# Patient Record
Sex: MALE | Race: WHITE
[De-identification: names, ages, dates, MRNs, and addresses within clinical notes are randomized per-mention and may not be internally consistent; named-entity substitution may affect disease eponyms.]

---
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
## 2024-03-21 ENCOUNTER — HOSPITAL ENCOUNTER (OUTPATIENT)
Facility: HOSPITAL | Age: 19
Setting detail: RECURRING SERIES
Discharge: HOME | End: 2024-03-24
Payer: OTHER GOVERNMENT

## 2024-03-21 PROCEDURE — 97161 PT EVAL LOW COMPLEX 20 MIN: CPT

## 2024-03-21 PROCEDURE — 97112 NEUROMUSCULAR REEDUCATION: CPT

## 2024-03-21 NOTE — THERAPY EVALUATION
[facility]  [address], [address]  [place] [address]   Phone: [phone] Fax: [phone]  Plan of Care / Statement of Necessity for Physical Therapy Services     Patient Name: Gaston Flanagan : 2005   Medical   Diagnosis: Pain in thoracic spine [M54.6] Treatment Diagnosis: M54.6  THORACIC PAIN   Onset Date: Dec 2023 Payor: Payor:  EAST / Plan:  EAST / Product Type: *No Product type* /    Referral Source: Teri rPuitt MD Start of Care (SOC): 3/21/2024   Prior Hospitalization: See medical history Provider #: [id number]   Prior Level of Function: Independent   Comorbidities: None reported     Assessment:    Pt is a RHD 19 yo male referred to PT for \"sprain of chondrosternal joint\". He presents with pain localized to R sided sternum into R ant shoulder s/sx consistent with MD referral and possible R pec strain.  Pt endorses TATY of performing 60 push-ups every other hour daily, notes no particular instance of injury but gradual onset of R sided chest pain until Dec 2023 when he ceased the activity. Pain ranges from 2-5/10, worse with \"stretching, coughing, heavy breathing, sleeping\", no change/improvement with heat or ice. Pt presents with pan with end ranges of R shoulder horizontal abduction/adduction, resisted R shoulder IR, and is TTP at R sternal border and pec insertion at humerus. Functional deficits at this time include exercise activity and sleeping. Pt would benefit from skilled PT to address these deficits to assist pt to full return to PLOF activity. HEP initiated with good pt tolerance noted. There are no red flags at this time.     Not post operative    Evaluation Complexity:  History:  LOW Complexity : Zero comorbidities / personal factors that will impact the outcome / POC; Examination:  HIGH Complexity : 4+ Standardized tests and measures addressing body structure, function, activity limitation and / or

## 2024-03-21 NOTE — PROGRESS NOTES
PT DAILY TREATMENT NOTE/SHOULDER EVAL     Patient Name: Gaston Flanagan    Date: 3/21/2024    : 2005  Insurance: Payor: [employer] EAST / Plan: [employer] EAST / Product Type: *No Product type* /      Patient  verified yes    Visit #   Current / Total 1 10   Time   In / Out 1230 112   Pain   In / Out 4 4   Subjective Functional Status/Changes: see POC       Treatment Area: Pain in thoracic spine [M54.6]    SUBJECTIVE  Pain Level (0-10 scale):   Current: 4/10 Best: 2/10 Worst: 5/10  Alleviated By:  Aggravated By: stretching, coughing/sneezing, sleeping, heavy breathing   No change: icing/heating    PLOF: I  Mechanism of Injury: Pt reports was doing a lot of exercises, was not appropriately resting, after a while strained muscle at sternum. Denies single episode of pain, popping, traumal insidiously got worse over time. Was doing a lot of push ups, approx 60 every other hour. This occurred 4 mo ago. Has refrained from all exercise over past 4 months. Denies improvements, has tried stretching, icing, heating.   Current symptoms/Complaints: Pain at R side of sternum, constant.   PMHx/Surgical Hx: no previous back, chest, shoulder injuries  Diagnostic Tests: Xrays \"showed nothing\"   Work Hx: not currently working   Hand Dominance: R         min [x]Eval                        Therapeutic Procedures:  Tx Min Billable or 1:1 Min (if diff from Tx Min) Procedure, Rationale, Specifics   10  [id number] Neuromuscular Re-Education (timed):  improve balance, coordination, kinesthetic sense, posture, core stability and proprioception to improve patient's ability to develop conscious control of individual muscles and awareness of position of extremities in order to progress to PLOF and address remaining functional goals. (see flow sheet as applicable)     Details if applicable:           Details if applicable:     Total Total Reminder: MC/BC bill using total billable min of TIMED therapeutic procedures (example: do not include

## 2024-03-26 ENCOUNTER — HOSPITAL ENCOUNTER (OUTPATIENT)
Facility: HOSPITAL | Age: 19
Setting detail: RECURRING SERIES
Discharge: HOME | End: 2024-03-29
Payer: OTHER GOVERNMENT

## 2024-03-26 PROCEDURE — 97112 NEUROMUSCULAR REEDUCATION: CPT

## 2024-03-26 PROCEDURE — 97110 THERAPEUTIC EXERCISES: CPT

## 2024-03-26 PROCEDURE — 97140 MANUAL THERAPY 1/> REGIONS: CPT

## 2024-03-26 NOTE — PROGRESS NOTES
PHYSICAL / OCCUPATIONAL THERAPY - DAILY TREATMENT NOTE    Patient Name: Gaston Flanagan    Date: 3/26/2024    : 2005  Insurance: Payor: [employer] EAST / Plan: [employer] EAST / Product Type: *No Product type* /      Patient  verified Yes     Visit #   Current / Total 2 10   Time   In / Out 820 910   Pain   In / Out 3 3   Subjective Functional Status/Changes: Received HEP and has been performing them as well as ice.     TREATMENT AREA =  Pain in thoracic spine [M54.6]    OBJECTIVE    Modalities Rationale:     decrease inflammation, decrease pain, and increase tissue extensibility to improve patient's ability to progress to PLOF and address remaining functional goals.     min [] Estim Unattended, type/location:                                      []  w/ice    []  w/heat    min [] Estim Attended, type/location:                                     []  w/US     []  w/ice    []  w/heat    []  TENS insruct      min []  Mechanical Traction: type/lbs                   []  pro   []  sup   []  int   []  cont    []  before manual    []  after manual    min []  Ultrasound, settings/location:      min  unbill []  Ice     []  Heat    location/position:     min []  Paraffin,  details:     min []  Vasopneumatic Device, press/temp:     min []  Whirlpool / Fluido:    If using vaso (only need to measure limb vaso being performed on)      pre-treatment girth :       post-treatment girth :       measured at (landmark location) :      min []  Other:    Skin assessment post-treatment:   Intact      Therapeutic Procedures:    Tx Min Billable or 1:1 Min (if diff from Tx Min) Procedure, Rationale, Specifics   10  [id number] Manual Therapy (timed):  decrease pain, increase ROM, and increase tissue extensibility to improve patient's ability to progress to PLOF and address remaining functional goals.  The manual therapy interventions were performed at a separate and distinct time from the therapeutic activities interventions . (see flow sheet as

## 2024-03-28 ENCOUNTER — HOSPITAL ENCOUNTER (OUTPATIENT)
Facility: HOSPITAL | Age: 19
Setting detail: RECURRING SERIES
Discharge: HOME | End: 2024-03-31
Payer: OTHER GOVERNMENT

## 2024-03-28 PROCEDURE — 97112 NEUROMUSCULAR REEDUCATION: CPT

## 2024-03-28 PROCEDURE — 97110 THERAPEUTIC EXERCISES: CPT

## 2024-03-28 PROCEDURE — 97140 MANUAL THERAPY 1/> REGIONS: CPT

## 2024-03-28 NOTE — PROGRESS NOTES
PHYSICAL / OCCUPATIONAL THERAPY - DAILY TREATMENT NOTE    Patient Name: Gaston Flanagan    Date: 3/28/2024    : 2005  Insurance: Payor:  EAST / Plan: [employer] EAST / Product Type: *No Product type* /      Patient  verified Yes     Visit #   Current / Total 3 10   Time   In / Out 1140 1230   Pain   In / Out 4 2   Subjective Functional Status/Changes: Just a little more pain today.  Just woke up with more pain, didn't do anything more, just the exercises.     TREATMENT AREA =  Pain in thoracic spine [M54.6]    OBJECTIVE    Modalities Rationale:     decrease inflammation, decrease pain, and increase tissue extensibility to improve patient's ability to progress to PLOF and address remaining functional goals.     min [] Estim Unattended, type/location:                                      []  w/ice    []  w/heat    min [] Estim Attended, type/location:                                     []  w/US     []  w/ice    []  w/heat    []  TENS insruct      min []  Mechanical Traction: type/lbs                   []  pro   []  sup   []  int   []  cont    []  before manual    []  after manual    min []  Ultrasound, settings/location:      min  unbill []  Ice     []  Heat    location/position:     min []  Paraffin,  details:     min []  Vasopneumatic Device, press/temp:     min []  Whirlpool / Fluido:    If using vaso (only need to measure limb vaso being performed on)      pre-treatment girth :       post-treatment girth :       measured at (landmark location) :      min []  Other:    Skin assessment post-treatment:   Intact      Therapeutic Procedures:    Tx Min Billable or 1:1 Min (if diff from Tx Min) Procedure, Rationale, Specifics   15  [id number] Manual Therapy (timed):  decrease pain, increase ROM, and increase tissue extensibility to improve patient's ability to progress to PLOF and address remaining functional goals.  The manual therapy interventions were performed at a separate and distinct time from the

## 2024-04-02 ENCOUNTER — HOSPITAL ENCOUNTER (OUTPATIENT)
Facility: HOSPITAL | Age: 19
Setting detail: RECURRING SERIES
Discharge: HOME | End: 2024-04-05
Payer: OTHER GOVERNMENT

## 2024-04-02 PROCEDURE — 97110 THERAPEUTIC EXERCISES: CPT

## 2024-04-02 PROCEDURE — 97112 NEUROMUSCULAR REEDUCATION: CPT

## 2024-04-02 PROCEDURE — 97035 APP MDLTY 1+ULTRASOUND EA 15: CPT

## 2024-04-02 PROCEDURE — 97140 MANUAL THERAPY 1/> REGIONS: CPT

## 2024-04-02 NOTE — PROGRESS NOTES
PHYSICAL / OCCUPATIONAL THERAPY - DAILY TREATMENT NOTE    Patient Name: Gaston Flanagan    Date: 2024    : 2005  Insurance: Payor: [employer] EAST / Plan: [employer] EAST / Product Type: *No Product type* /      Patient  verified Yes     Visit #   Current / Total 4 10   Time   In / Out 820 920   Pain   In / Out 3 1   Subjective Functional Status/Changes: Last few days I've had this tight feeling in my chest and like a /10.     TREATMENT AREA =  Pain in thoracic spine [M54.6]    OBJECTIVE    Modalities Rationale:     decrease inflammation, decrease pain, and increase tissue extensibility to improve patient's ability to progress to PLOF and address remaining functional goals.     min [] Estim Unattended, type/location:                                      []  w/ice    []  w/heat    min [] Estim Attended, type/location:                                     []  w/US     []  w/ice    []  w/heat    []  TENS insruct      min []  Mechanical Traction: type/lbs                   []  pro   []  sup   []  int   []  cont    []  before manual    []  after manual   8 min [x]  Ultrasound, settings/location:  Pulsed 20% 3.3 Mhz, (R) pec.    min  unbill []  Ice     []  Heat    location/position:     min []  Paraffin,  details:     min []  Vasopneumatic Device, press/temp:     min []  Whirlpool / Fluido:    If using vaso (only need to measure limb vaso being performed on)      pre-treatment girth :       post-treatment girth :       measured at (landmark location) :      min []  Other:    Skin assessment post-treatment:   Intact      Therapeutic Procedures:    Tx Min Billable or 1:1 Min (if diff from Tx Min) Procedure, Rationale, Specifics   15  [id number] Manual Therapy (timed):  decrease pain, increase ROM, and increase tissue extensibility to improve patient's ability to progress to PLOF and address remaining functional goals.  The manual therapy interventions were performed at a separate and distinct time from the therapeutic

## 2024-04-04 ENCOUNTER — HOSPITAL ENCOUNTER (OUTPATIENT)
Facility: HOSPITAL | Age: 19
Setting detail: RECURRING SERIES
Discharge: HOME | End: 2024-04-07
Payer: OTHER GOVERNMENT

## 2024-04-04 PROCEDURE — 97140 MANUAL THERAPY 1/> REGIONS: CPT

## 2024-04-04 PROCEDURE — 97110 THERAPEUTIC EXERCISES: CPT

## 2024-04-04 PROCEDURE — 97112 NEUROMUSCULAR REEDUCATION: CPT

## 2024-04-04 PROCEDURE — 97035 APP MDLTY 1+ULTRASOUND EA 15: CPT

## 2024-04-04 NOTE — PROGRESS NOTES
PHYSICAL / OCCUPATIONAL THERAPY - DAILY TREATMENT NOTE    Patient Name: Gaston Flanagan    Date: 2024    : 2005  Insurance: Payor: [employer] EAST / Plan: [employer] EAST / Product Type: *No Product type* /      Patient  verified Yes     Visit #   Current / Total 5 10   Time   In / Out 820 920   Pain   In / Out 2 0   Subjective Functional Status/Changes: I woke up with less stiffness and less pain.     TREATMENT AREA =  Pain in thoracic spine [M54.6]    OBJECTIVE    Modalities Rationale:     decrease inflammation, decrease pain, and increase tissue extensibility to improve patient's ability to progress to PLOF and address remaining functional goals.     min [] Estim Unattended, type/location:                                      []  w/ice    []  w/heat    min [] Estim Attended, type/location:                                     []  w/US     []  w/ice    []  w/heat    []  TENS insruct      min []  Mechanical Traction: type/lbs                   []  pro   []  sup   []  int   []  cont    []  before manual    []  after manual   8 min [x]  Ultrasound, settings/location:  Pulsed 20% 3.3 Mhz, (R) pec.    min  unbill []  Ice     []  Heat    location/position:     min []  Paraffin,  details:     min []  Vasopneumatic Device, press/temp:     min []  Whirlpool / Fluido:    If using vaso (only need to measure limb vaso being performed on)      pre-treatment girth :       post-treatment girth :       measured at (landmark location) :      min []  Other:    Skin assessment post-treatment:   Intact      Therapeutic Procedures:    Tx Min Billable or 1:1 Min (if diff from Tx Min) Procedure, Rationale, Specifics   15  95406 Manual Therapy (timed):  decrease pain, increase ROM, and increase tissue extensibility to improve patient's ability to progress to PLOF and address remaining functional goals.  The manual therapy interventions were performed at a separate and distinct time from the therapeutic activities interventions .

## 2024-04-09 ENCOUNTER — HOSPITAL ENCOUNTER (OUTPATIENT)
Facility: HOSPITAL | Age: 19
Setting detail: RECURRING SERIES
Discharge: HOME | End: 2024-04-12
Payer: OTHER GOVERNMENT

## 2024-04-09 PROCEDURE — 97110 THERAPEUTIC EXERCISES: CPT

## 2024-04-09 PROCEDURE — 97112 NEUROMUSCULAR REEDUCATION: CPT

## 2024-04-09 PROCEDURE — 97035 APP MDLTY 1+ULTRASOUND EA 15: CPT

## 2024-04-09 PROCEDURE — 97140 MANUAL THERAPY 1/> REGIONS: CPT

## 2024-04-09 NOTE — PROGRESS NOTES
from the therapeutic activities interventions . (see flow sheet as applicable)     Details if applicable:  STM pec min/kayden and along boarder of sternum  MFR pec major     20  00546 Therapeutic Exercise (timed):  increase ROM, strength, coordination, balance, and proprioception to improve patient's ability to progress to PLOF and address remaining functional goals. (see flow sheet as applicable)     Details if applicable:     17  48694 Neuromuscular Re-Education (timed):  improve balance, coordination, kinesthetic sense, posture, core stability and proprioception to improve patient's ability to develop conscious control of individual muscles and awareness of position of extremities in order to progress to PLOF and address remaining functional goals. (see flow sheet as applicable)     Details if applicable:       20349 Therapeutic Activity (timed):  use of dynamic activities replicating functional movements to increase ROM, strength, coordination, balance, and proprioception in order to improve patient's ability to progress to PLOF and address remaining functional goals.  (see flow sheet as applicable)     Details if applicable:            Details if applicable:     60  MC BC Totals Reminder: bill using total billable min of TIMED therapeutic procedures (example: do not include dry needle or estim unattended, both untimed codes, in totals to left)  8-22 min = 1 unit; 23-37 min = 2 units; 38-52 min = 3 units; 53-67 min = 4 units; 68-82 min = 5 units   Total Total     [x]  Patient Education billed concurrently with other procedures   [x] Review HEP    [] Progressed/Changed HEP, detail:    [] Other detail:       Objective Information/Functional Measures/Assessment    Improving stability and reduction of overall symptoms along right side of costal region of chest.  No pain with incline push up today.    Reducing of symptoms with iso Habd and pec stretch.    Patient will continue to benefit from skilled PT / OT services to

## 2024-04-11 ENCOUNTER — HOSPITAL ENCOUNTER (OUTPATIENT)
Facility: HOSPITAL | Age: 19
Setting detail: RECURRING SERIES
Discharge: HOME | End: 2024-04-14
Payer: OTHER GOVERNMENT

## 2024-04-11 PROCEDURE — 97140 MANUAL THERAPY 1/> REGIONS: CPT

## 2024-04-11 PROCEDURE — 97112 NEUROMUSCULAR REEDUCATION: CPT

## 2024-04-11 PROCEDURE — 97035 APP MDLTY 1+ULTRASOUND EA 15: CPT

## 2024-04-11 PROCEDURE — 97110 THERAPEUTIC EXERCISES: CPT

## 2024-04-11 NOTE — PROGRESS NOTES
PHYSICAL / OCCUPATIONAL THERAPY - DAILY TREATMENT NOTE    Patient Name: Gaston Flanagan    Date: 2024    : 2005  Insurance: Payor: [employer] EAST / Plan: [employer] EAST / Product Type: *No Product type* /      Patient  verified Yes     Visit #   Current / Total 7 10   Time   In / Out 1020 1120   Pain   In / Out 2 2   Subjective Functional Status/Changes:  but not as much tightness.  I try to keep my posture more upright so I don't start to feel that tightness.     TREATMENT AREA =  Pain in thoracic spine [M54.6]    OBJECTIVE    Modalities Rationale:     decrease inflammation, decrease pain, and increase tissue extensibility to improve patient's ability to progress to PLOF and address remaining functional goals.     min [] Estim Unattended, type/location:                                      []  w/ice    []  w/heat    min [] Estim Attended, type/location:                                     []  w/US     []  w/ice    []  w/heat    []  TENS insruct      min []  Mechanical Traction: type/lbs                   []  pro   []  sup   []  int   []  cont    []  before manual    []  after manual   8 min [x]  Ultrasound, settings/location:  Pulsed 20% 3.3 Mhz, (R) pec.    min  unbill []  Ice     []  Heat    location/position:     min []  Paraffin,  details:     min []  Vasopneumatic Device, press/temp:     min []  Whirlpool / Fluido:    If using vaso (only need to measure limb vaso being performed on)      pre-treatment girth :       post-treatment girth :       measured at (landmark location) :      min []  Other:    Skin assessment post-treatment:   Intact      Therapeutic Procedures:    Tx Min Billable or 1:1 Min (if diff from Tx Min) Procedure, Rationale, Specifics   15  29763 Manual Therapy (timed):  decrease pain, increase ROM, and increase tissue extensibility to improve patient's ability to progress to PLOF and address remaining functional goals.  The manual therapy interventions were performed at a

## 2024-04-16 ENCOUNTER — HOSPITAL ENCOUNTER (OUTPATIENT)
Facility: HOSPITAL | Age: 19
Setting detail: RECURRING SERIES
Discharge: HOME | End: 2024-04-19
Payer: OTHER GOVERNMENT

## 2024-04-16 PROCEDURE — 97140 MANUAL THERAPY 1/> REGIONS: CPT

## 2024-04-16 PROCEDURE — 97110 THERAPEUTIC EXERCISES: CPT

## 2024-04-16 PROCEDURE — 97035 APP MDLTY 1+ULTRASOUND EA 15: CPT

## 2024-04-16 PROCEDURE — 97112 NEUROMUSCULAR REEDUCATION: CPT

## 2024-04-16 NOTE — PROGRESS NOTES
PHYSICAL / OCCUPATIONAL THERAPY - DAILY TREATMENT NOTE    Patient Name: Gaston Flanagan    Date: 2024    : 2005  Insurance: Payor: [employer] EAST / Plan: [employer] EAST / Product Type: *No Product type* /      Patient  verified Yes     Visit #   Current / Total 8 10   Time   In / Out 1020 1120   Pain   In / Out 2 0   Subjective Functional Status/Changes: No new problems this past weekend.     TREATMENT AREA =  Pain in thoracic spine [M54.6]    OBJECTIVE    Modalities Rationale:     decrease inflammation, decrease pain, and increase tissue extensibility to improve patient's ability to progress to PLOF and address remaining functional goals.     min [] Estim Unattended, type/location:                                      []  w/ice    []  w/heat    min [] Estim Attended, type/location:                                     []  w/US     []  w/ice    []  w/heat    []  TENS insruct      min []  Mechanical Traction: type/lbs                   []  pro   []  sup   []  int   []  cont    []  before manual    []  after manual   8 min [x]  Ultrasound, settings/location:  Pulsed 20% 3.3 Mhz, (R) pec.    min  unbill []  Ice     []  Heat    location/position:     min []  Paraffin,  details:     min []  Vasopneumatic Device, press/temp:     min []  Whirlpool / Fluido:    If using vaso (only need to measure limb vaso being performed on)      pre-treatment girth :       post-treatment girth :       measured at (landmark location) :      min []  Other:    Skin assessment post-treatment:   Intact      Therapeutic Procedures:    Tx Min Billable or 1:1 Min (if diff from Tx Min) Procedure, Rationale, Specifics   15  25757 Manual Therapy (timed):  decrease pain, increase ROM, and increase tissue extensibility to improve patient's ability to progress to PLOF and address remaining functional goals.  The manual therapy interventions were performed at a separate and distinct time from the therapeutic activities interventions . (see flow

## 2024-04-18 ENCOUNTER — HOSPITAL ENCOUNTER (OUTPATIENT)
Facility: HOSPITAL | Age: 19
Setting detail: RECURRING SERIES
Discharge: HOME | End: 2024-04-21
Payer: OTHER GOVERNMENT

## 2024-04-18 PROCEDURE — 97110 THERAPEUTIC EXERCISES: CPT

## 2024-04-18 PROCEDURE — 97535 SELF CARE MNGMENT TRAINING: CPT

## 2024-04-18 NOTE — THERAPY RECERTIFICATION
[facility]  [address], [address], [place] [address] Ph: [phone] Fx: [phone]  PHYSICAL THERAPY PROGRESS NOTE  Patient Name: Gaston Flanagan : 2005   Treatment/Medical Diagnosis: Pain in thoracic spine [M54.6]   Referral Source: Teri Pruitt MD     Date of Initial Visit: 3/21/24 Attended Visits: 9 Missed Visits: 0     SUMMARY OF TREATMENT  Pt is a RHD 19 yo male referred to PT for \"sprain of chondrosternal joint\". He presents with pain localized to R sided sternum into R ant shoulder s/sx consistent with MD referral and possible R pec strain. Pt has been assessed, completed therapeutic exercises, neuromuscular re-ed, therapeutic functional activity, received manual therapy intervention, self-care strategies, HEP techniques and pt ed on condition consistency and follow-through.     CURRENT STATUS  Patient has had good tolerance to all therapeutic interventions and has reported some improvement with symptoms.  Patient is unable to return to normal workout routine at this time secondary to increase of symptoms when chest musculature is fully loaded.  Patient is also unable to awaken without having increased stiffness and discomfort at sternum (R) side > (L) side.    GROC: +2 a little better      Patient reports ~ 40% reduction of symptoms.     Patient reports ~30% improvement with performance of all general ADLs.     Patient reports standing tolerance: no problems minutes, walking tolerance: no problems, sitting tolerance: no problems.     Reports ~6 hours of sleep, but wakes up with soreness and tenderness at site of injury.       Pt to demo 5/5 strength without pain in MMT testing for R rhomboid and low trap for improved R shoulder mechanics  Status at assessment: 4/5 low trap with pain, 5/5 rhomboids with pain  Current Status: 5/5  Goal Met?  yes    2.  Pt to demo x20 full push ups with good technique and without pain for return

## 2024-04-18 NOTE — PROGRESS NOTES
PHYSICAL / OCCUPATIONAL THERAPY - DAILY TREATMENT NOTE    Patient Name: Gaston Flanagan    Date: 2024    : 2005  Insurance: Payor:  EAST / Plan: [employer] EAST / Product Type: *No Product type* /      Patient  verified Yes     Visit #   Current / Total 9 10   Time   In / Out 1020 1050   Pain   In / Out 1 1   Subjective Functional Status/Changes: I  I'm not really having that pain but it still feels like it's swollen.     TREATMENT AREA =  Pain in thoracic spine [M54.6]    OBJECTIVE    Modalities Rationale:     decrease inflammation, decrease pain, and increase tissue extensibility to improve patient's ability to progress to PLOF and address remaining functional goals.     min [] Estim Unattended, type/location:                                      []  w/ice    []  w/heat    min [] Estim Attended, type/location:                                     []  w/US     []  w/ice    []  w/heat    []  TENS insruct      min []  Mechanical Traction: type/lbs                   []  pro   []  sup   []  int   []  cont    []  before manual    []  after manual    min [x]  Ultrasound, settings/location:  Pulsed 20% 3.3 Mhz, (R) pec.    min  unbill []  Ice     []  Heat    location/position:     min []  Paraffin,  details:     min []  Vasopneumatic Device, press/temp:     min []  Whirlpool / Fluido:    If using vaso (only need to measure limb vaso being performed on)      pre-treatment girth :       post-treatment girth :       measured at (landmark location) :      min []  Other:    Skin assessment post-treatment:   Intact      Therapeutic Procedures:    Tx Min Billable or 1:1 Min (if diff from Tx Min) Procedure, Rationale, Specifics     69075 Manual Therapy (timed):  decrease pain, increase ROM, and increase tissue extensibility to improve patient's ability to progress to PLOF and address remaining functional goals.  The manual therapy interventions were performed at a separate and distinct time from the therapeutic

## 2024-04-23 ENCOUNTER — HOSPITAL ENCOUNTER (OUTPATIENT)
Facility: HOSPITAL | Age: 19
Setting detail: RECURRING SERIES
Discharge: HOME | End: 2024-04-26
Payer: OTHER GOVERNMENT

## 2024-04-23 PROCEDURE — 97140 MANUAL THERAPY 1/> REGIONS: CPT

## 2024-04-23 PROCEDURE — 97035 APP MDLTY 1+ULTRASOUND EA 15: CPT

## 2024-04-23 PROCEDURE — 97110 THERAPEUTIC EXERCISES: CPT

## 2024-04-23 NOTE — PROGRESS NOTES
PHYSICAL / OCCUPATIONAL THERAPY - DAILY TREATMENT NOTE    Patient Name: Gaston Flanagan    Date: 2024    : 2005  Insurance: Payor:  EAST / Plan: [email or web address] EAST / Product Type: *No Product type* /      Patient  verified Yes     Visit #   Current / Total 10 18   Time   In / Out 900 950   Pain   In / Out 2 0   Subjective Functional Status/Changes: Was hurting a bit more over this past weekend but it seems to have calmed down.     TREATMENT AREA =  Pain in thoracic spine [M54.6]    OBJECTIVE    Modalities Rationale:     decrease inflammation, decrease pain, and increase tissue extensibility to improve patient's ability to progress to PLOF and address remaining functional goals.     min [] Estim Unattended, type/location:                                      []  w/ice    []  w/heat    min [] Estim Attended, type/location:                                     []  w/US     []  w/ice    []  w/heat    []  TENS insruct      min []  Mechanical Traction: type/lbs                   []  pro   []  sup   []  int   []  cont    []  before manual    []  after manual   8 min [x]  Ultrasound, settings/location:  Pulsed 20% 3.3 Mhz, (R) pec.    min  unbill []  Ice     []  Heat    location/position:     min []  Paraffin,  details:     min []  Vasopneumatic Device, press/temp:     min []  Whirlpool / Fluido:    If using vaso (only need to measure limb vaso being performed on)      pre-treatment girth :       post-treatment girth :       measured at (landmark location) :      min []  Other:    Skin assessment post-treatment:   Intact      Therapeutic Procedures:    Tx Min Billable or 1:1 Min (if diff from Tx Min) Procedure, Rationale, Specifics   25  52043 Manual Therapy (timed):  decrease pain, increase ROM, and increase tissue extensibility to improve patient's ability to progress to PLOF and address remaining functional goals.  The manual therapy interventions were performed at a separate and distinct time from the

## 2024-04-25 ENCOUNTER — HOSPITAL ENCOUNTER (OUTPATIENT)
Facility: HOSPITAL | Age: 19
Setting detail: RECURRING SERIES
Discharge: HOME | End: 2024-04-28
Payer: OTHER GOVERNMENT

## 2024-04-25 PROCEDURE — 97140 MANUAL THERAPY 1/> REGIONS: CPT

## 2024-04-25 PROCEDURE — 97110 THERAPEUTIC EXERCISES: CPT

## 2024-04-25 PROCEDURE — 97035 APP MDLTY 1+ULTRASOUND EA 15: CPT

## 2024-04-25 NOTE — PROGRESS NOTES
PHYSICAL / OCCUPATIONAL THERAPY - DAILY TREATMENT NOTE    Patient Name: Gatson Flanagan    Date: 2024    : 2005  Insurance: Payor: [employer] EAST / Plan: [employer] EAST / Product Type: *No Product type* /      Patient  verified Yes     Visit #   Current / Total 11 18   Time   In / Out 300 347   Pain   In / Out 3 0   Subjective Functional Status/Changes: Pt reports incr pain to 5/10 yesterday upon waking up in the morning, he treated with ice and motrin and is better today. Pt reports no pain during or after exercise at PT session 2 days ago, believes incr pain is d/t sleeping.      TREATMENT AREA =  Pain in thoracic spine [M54.6]    OBJECTIVE    Modalities Rationale:     decrease inflammation, decrease pain, and increase tissue extensibility to improve patient's ability to progress to PLOF and address remaining functional goals.     min [] Estim Unattended, type/location:                                      []  w/ice    []  w/heat    min [] Estim Attended, type/location:                                     []  w/US     []  w/ice    []  w/heat    []  TENS insruct      min []  Mechanical Traction: type/lbs                   []  pro   []  sup   []  int   []  cont    []  before manual    []  after manual   8 min [x]  Ultrasound, settings/location:  Pulsed 20% 3.3 Mhz, (R) pec.    min  unbill []  Ice     []  Heat    location/position:     min []  Paraffin,  details:     min []  Vasopneumatic Device, press/temp:     min []  Whirlpool / Fluido:    If using vaso (only need to measure limb vaso being performed on)      pre-treatment girth :       post-treatment girth :       measured at (landmark location) :      min []  Other:    Skin assessment post-treatment:   Intact      Therapeutic Procedures:    Tx Min Billable or 1:1 Min (if diff from Tx Min) Procedure, Rationale, Specifics   9  95671 Manual Therapy (timed):  decrease pain, increase ROM, and increase tissue extensibility to improve patient's ability to

## 2024-04-30 ENCOUNTER — HOSPITAL ENCOUNTER (OUTPATIENT)
Facility: HOSPITAL | Age: 19
Setting detail: RECURRING SERIES
Discharge: HOME | End: 2024-05-03
Payer: OTHER GOVERNMENT

## 2024-04-30 PROCEDURE — 97035 APP MDLTY 1+ULTRASOUND EA 15: CPT

## 2024-04-30 PROCEDURE — 97110 THERAPEUTIC EXERCISES: CPT

## 2024-04-30 PROCEDURE — 97140 MANUAL THERAPY 1/> REGIONS: CPT

## 2024-04-30 NOTE — PROGRESS NOTES
PHYSICAL / OCCUPATIONAL THERAPY - DAILY TREATMENT NOTE    Patient Name: Gaston Flanagan    Date: 2024    : 2005  Insurance: Payor: [employer] EAST / Plan: [employer] EAST / Product Type: *No Product type* /      Patient  verified Yes     Visit #   Current / Total 12 18   Time   In / Out 225 320   Pain   In / Out 3 0   Subjective Functional Status/Changes: I've not had that feeling since the last time I was here.     TREATMENT AREA =  Pain in thoracic spine [M54.6]    OBJECTIVE    Modalities Rationale:     decrease inflammation, decrease pain, and increase tissue extensibility to improve patient's ability to progress to PLOF and address remaining functional goals.     min [] Estim Unattended, type/location:                                      []  w/ice    []  w/heat    min [] Estim Attended, type/location:                                     []  w/US     []  w/ice    []  w/heat    []  TENS insruct      min []  Mechanical Traction: type/lbs                   []  pro   []  sup   []  int   []  cont    []  before manual    []  after manual   8 min [x]  Ultrasound, settings/location:  Pulsed 20% 1.2 Mhz, (R) pec.    min  unbill []  Ice     []  Heat    location/position:     min []  Paraffin,  details:     min []  Vasopneumatic Device, press/temp:     min []  Whirlpool / Fluido:    If using vaso (only need to measure limb vaso being performed on)      pre-treatment girth :       post-treatment girth :       measured at (landmark location) :      min []  Other:    Skin assessment post-treatment:   Intact      Therapeutic Procedures:    Tx Min Billable or 1:1 Min (if diff from Tx Min) Procedure, Rationale, Specifics   88 [id number] Manual Therapy (timed):  decrease pain, increase ROM, and increase tissue extensibility to improve patient's ability to progress to PLOF and address remaining functional goals.  The manual therapy interventions were performed at a separate and distinct time from the therapeutic activities

## 2024-05-02 ENCOUNTER — HOSPITAL ENCOUNTER (OUTPATIENT)
Facility: HOSPITAL | Age: 19
Setting detail: RECURRING SERIES
Discharge: HOME | End: 2024-05-05
Payer: OTHER GOVERNMENT

## 2024-05-02 PROCEDURE — 97140 MANUAL THERAPY 1/> REGIONS: CPT

## 2024-05-02 PROCEDURE — 97035 APP MDLTY 1+ULTRASOUND EA 15: CPT

## 2024-05-02 PROCEDURE — 97110 THERAPEUTIC EXERCISES: CPT

## 2024-05-07 ENCOUNTER — HOSPITAL ENCOUNTER (OUTPATIENT)
Facility: HOSPITAL | Age: 19
Setting detail: RECURRING SERIES
Discharge: HOME | End: 2024-05-10
Payer: OTHER GOVERNMENT

## 2024-05-07 PROCEDURE — 97035 APP MDLTY 1+ULTRASOUND EA 15: CPT

## 2024-05-07 PROCEDURE — 97140 MANUAL THERAPY 1/> REGIONS: CPT

## 2024-05-07 PROCEDURE — 97110 THERAPEUTIC EXERCISES: CPT

## 2024-05-07 NOTE — PROGRESS NOTES
from skilled PT / OT services to modify and progress therapeutic interventions, analyze and address functional mobility deficits, analyze and address ROM deficits, analyze and address strength deficits, analyze and address soft tissue restrictions, and analyze and cue for proper movement patterns to address functional deficits and attain remaining goals.    Progress toward goals / Updated goals:  []  See Progress Note/Recertification    New Goals to be achieved in __4__ WEEKS  1. Pt to demo x20 full push ups with good technique and without pain for return to PLOF  2.  Patient to CC pec fly 10# x 10 without chest pain, single arm chest press 15# x 10: without chest pain.  3. GROC score to be +5: a good deal better.    Next PN/ RC due 5/21/24      PLAN  - Continue Plan of Care  - Upgrade activities as tolerated  - Discharge due to :      Jesus Gallegos PTA    5/7/2024    6:16 AM    Future Appointments   Date Time Provider Department Center   5/7/2024  9:40 AM [facility]   5/9/2024  9:40 AM Jesus Gallegos PTA [facility]

## 2024-05-09 ENCOUNTER — HOSPITAL ENCOUNTER (OUTPATIENT)
Facility: HOSPITAL | Age: 19
Setting detail: RECURRING SERIES
Discharge: HOME | End: 2024-05-12
Payer: OTHER GOVERNMENT

## 2024-05-09 PROCEDURE — 97110 THERAPEUTIC EXERCISES: CPT

## 2024-05-09 PROCEDURE — 97035 APP MDLTY 1+ULTRASOUND EA 15: CPT

## 2024-05-09 PROCEDURE — 97140 MANUAL THERAPY 1/> REGIONS: CPT

## 2024-05-09 NOTE — PROGRESS NOTES
PHYSICAL / OCCUPATIONAL THERAPY - DAILY TREATMENT NOTE    Patient Name: Gaston Flanagan    Date: 2024    : 2005  Insurance: Payor: [employer] EAST / Plan: [employer] EAST PRIME / Product Type: *No Product type* /      Patient  verified Yes     Visit #   Current / Total 15 24   Time   In / Out 940 1030   Pain   In / Out 2 0   Subjective Functional Status/Changes: Not extra pain after last session.  Seems better.     TREATMENT AREA =  Pain in thoracic spine [M54.6]    OBJECTIVE    Modalities Rationale:     decrease inflammation, decrease pain, and increase tissue extensibility to improve patient's ability to progress to PLOF and address remaining functional goals.     min [] Estim Unattended, type/location:                                      []  w/ice    []  w/heat    min [] Estim Attended, type/location:                                     []  w/US     []  w/ice    []  w/heat    []  TENS insruct      min []  Mechanical Traction: type/lbs                   []  pro   []  sup   []  int   []  cont    []  before manual    []  after manual   8 min [x]  Ultrasound, settings/location:  Pulsed 20% 1.2 Mhz, (R) pec.    min  unbill []  Ice     []  Heat    location/position:     min []  Paraffin,  details:     min []  Vasopneumatic Device, press/temp:     min []  Whirlpool / Fluido:    If using vaso (only need to measure limb vaso being performed on)      pre-treatment girth :       post-treatment girth :       measured at (landmark location) :      min []  Other:    Skin assessment post-treatment:   Intact      Therapeutic Procedures:    Tx Min Billable or 1:1 Min (if diff from Tx Min) Procedure, Rationale, Specifics   15  15942 Manual Therapy (timed):  decrease pain, increase ROM, and increase tissue extensibility to improve patient's ability to progress to PLOF and address remaining functional goals.  The manual therapy interventions were performed at a separate and distinct time from the therapeutic activities

## 2024-05-13 ENCOUNTER — HOSPITAL ENCOUNTER (OUTPATIENT)
Facility: HOSPITAL | Age: 19
Setting detail: RECURRING SERIES
Discharge: HOME | End: 2024-05-16
Payer: OTHER GOVERNMENT

## 2024-05-13 PROCEDURE — 97110 THERAPEUTIC EXERCISES: CPT

## 2024-05-13 PROCEDURE — 97140 MANUAL THERAPY 1/> REGIONS: CPT

## 2024-05-13 PROCEDURE — 97035 APP MDLTY 1+ULTRASOUND EA 15: CPT

## 2024-05-13 NOTE — PROGRESS NOTES
PHYSICAL / OCCUPATIONAL THERAPY - DAILY TREATMENT NOTE    Patient Name: Gaston Flanagan    Date: 2024    : 2005  Insurance: Payor:  EAST / Plan: [employer] EAST PRIME / Product Type: *No Product type* /      Patient  verified Yes     Visit #   Current / Total 16 24   Time   In / Out 900 950   Pain   In / Out 2 0   Subjective Functional Status/Changes: I was working but nothing strenuous, just holding the hose and using spray bottles.  But no motion really.  Felt good after the workout, no problems afterwards.  Painw as like a 4/10.     TREATMENT AREA =  Pain in thoracic spine [M54.6]    OBJECTIVE    Modalities Rationale:     decrease inflammation, decrease pain, and increase tissue extensibility to improve patient's ability to progress to PLOF and address remaining functional goals.     min [] Estim Unattended, type/location:                                      []  w/ice    []  w/heat    min [] Estim Attended, type/location:                                     []  w/US     []  w/ice    []  w/heat    []  TENS insruct      min []  Mechanical Traction: type/lbs                   []  pro   []  sup   []  int   []  cont    []  before manual    []  after manual   8 min [x]  Ultrasound, settings/location:  Pulsed 20% 1.2 Mhz, (R) pec.    min  unbill []  Ice     []  Heat    location/position:     min []  Paraffin,  details:     min []  Vasopneumatic Device, press/temp:     min []  Whirlpool / Fluido:    If using vaso (only need to measure limb vaso being performed on)      pre-treatment girth :       post-treatment girth :       measured at (landmark location) :      min []  Other:    Skin assessment post-treatment:   Intact      Therapeutic Procedures:    Tx Min Billable or 1:1 Min (if diff from Tx Min) Procedure, Rationale, Specifics   15  09780 Manual Therapy (timed):  decrease pain, increase ROM, and increase tissue extensibility to improve patient's ability to progress to PLOF and address remaining

## 2024-05-15 ENCOUNTER — HOSPITAL ENCOUNTER (OUTPATIENT)
Facility: HOSPITAL | Age: 19
Setting detail: RECURRING SERIES
Discharge: HOME | End: 2024-05-18
Payer: OTHER GOVERNMENT

## 2024-05-15 PROCEDURE — 97535 SELF CARE MNGMENT TRAINING: CPT

## 2024-05-15 PROCEDURE — 97110 THERAPEUTIC EXERCISES: CPT

## 2024-05-15 NOTE — PROGRESS NOTES
PHYSICAL / OCCUPATIONAL THERAPY - DAILY TREATMENT NOTE    Patient Name: Gaston Flanagan    Date: 5/15/2024    : 2005  Insurance: Payor:  EAST / Plan:  EAST PRIME / Product Type: *No Product type* /      Patient  verified Yes     Visit #   Current / Total 17 24   Time   In / Out 940 1005   Pain   In / Out 1 0   Subjective Functional Status/Changes: Chest feels good today.     TREATMENT AREA =  Pain in thoracic spine [M54.6]    OBJECTIVE    Therapeutic Procedures:    Tx Min Billable or 1:1 Min (if diff from Tx Min) Procedure, Rationale, Specifics   10  24548 Therapeutic Exercise (timed):  increase ROM, strength, coordination, balance, and proprioception to improve patient's ability to progress to PLOF and address remaining functional goals. (see flow sheet as applicable)     Details if applicable:     15   [id number] Self Care/Home Management (timed):  improve patient knowledge and understanding of assess goals and function  to improve patient's ability to progress to PLOF and address remaining functional goals.  (see flow sheet as applicable)       Details if applicable:     25  [facility] Totals Reminder: bill using total billable min of TIMED therapeutic procedures (example: do not include dry needle or estim unattended, both untimed codes, in totals to left)  8-22 min = 1 unit; 23-37 min = 2 units; 38-52 min = 3 units; 53-67 min = 4 units; 68-82 min = 5 units   Total Total     [x]  Patient Education billed concurrently with other procedures   [x] Review HEP    [] Progressed/Changed HEP, detail:    [] Other detail:       Objective Information/Functional Measures/Assessment    GROC: +3 somewhat better     Patient reports ~ 70% reduction of symptoms.     Patient reports ~80% improvement with performance of all general ADLs.     Reports ~6 hours of sleep, but wakes up with soreness and tenderness at site of injury. Pain is more intermittent at this time.    No pain with (B) shoulder flexion, hor ABD: full